# Patient Record
Sex: MALE | Race: WHITE | NOT HISPANIC OR LATINO | Employment: FULL TIME | URBAN - METROPOLITAN AREA
[De-identification: names, ages, dates, MRNs, and addresses within clinical notes are randomized per-mention and may not be internally consistent; named-entity substitution may affect disease eponyms.]

---

## 2023-06-12 ENCOUNTER — OFFICE VISIT (OUTPATIENT)
Dept: URGENT CARE | Facility: CLINIC | Age: 68
End: 2023-06-12
Payer: COMMERCIAL

## 2023-06-12 VITALS
OXYGEN SATURATION: 97 % | HEART RATE: 60 BPM | SYSTOLIC BLOOD PRESSURE: 122 MMHG | BODY MASS INDEX: 28.93 KG/M2 | HEIGHT: 66 IN | TEMPERATURE: 98 F | WEIGHT: 180 LBS | DIASTOLIC BLOOD PRESSURE: 80 MMHG

## 2023-06-12 DIAGNOSIS — R42 DIZZINESS: Primary | ICD-10-CM

## 2023-06-12 PROCEDURE — 99202 PR OFFICE/OUTPT VISIT, NEW, LEVL II, 15-29 MIN: ICD-10-PCS | Mod: ,,, | Performed by: NURSE PRACTITIONER

## 2023-06-12 PROCEDURE — 99202 OFFICE O/P NEW SF 15 MIN: CPT | Mod: ,,, | Performed by: NURSE PRACTITIONER

## 2023-06-12 NOTE — PROGRESS NOTES
"Subjective:       Patient ID: Ishmael Fuchs is a 68 y.o. male.    Vitals:  height is 5' 6" (1.676 m) and weight is 81.6 kg (180 lb). His oral temperature is 97.8 °F (36.6 °C). His blood pressure is 122/80 and his pulse is 60. His oxygen saturation is 97%.     Chief Complaint: Dizziness (Dizziness since last Monday.)    This is a 68 y.o. male who presents today with a chief complaint of dizziness for a week.  Want an appointment for physical therapy to  have Semont Maneuver at physical therapy. Patient explains that he has been seen for these symptoms by ENT who recommended that he undergo PT for same. Patient explains that he is here to get a referral to PT.        Dizziness:   Chronicity:  New  Onset:  1 to 4 weeks ago  Progression since onset:  Unchanged  Pain Scale:  0/10  Severity:  Severe  Duration:  Off/on all day  Dizziness characteristics:  Off-balance, walking on uneven surface, trouble focusing eyes, spinning inside head only and height vertigo (vertigo)   Associated symptoms: headaches, tinnitus, nausea, vomiting, visual disturbances and light-headedness.  Aggravated by:  Position changes and getting up  Risk factors:  Family history of inner ear and occupational noise exposure  Treatments tried: semont maneuver.   PMH includes: ear infections.    HENT:  Positive for tinnitus.    Gastrointestinal:  Positive for nausea and vomiting.   Neurological:  Positive for dizziness, light-headedness and headaches.         Objective:      Physical Exam   Constitutional: He is oriented to person, place, and time. normal  HENT:   Head: Normocephalic.   Nose: Nose normal.   Mouth/Throat: Mucous membranes are moist. Oropharynx is clear.   Eyes: Conjunctivae are normal. Extraocular movement intact   Neck: Neck supple.   Cardiovascular: Normal rate, regular rhythm, normal heart sounds and normal pulses.   Pulmonary/Chest: Effort normal and breath sounds normal.   Abdominal: Normal appearance.   Musculoskeletal: Normal " range of motion.         General: Normal range of motion.   Neurological: He is alert and oriented to person, place, and time.   Skin: Skin is warm and dry.   Psychiatric: His behavior is normal. Mood normal.   Vitals reviewed.      Past medical history and current medications reviewed.       Assessment:           1. Dizziness              Plan:         Dizziness             There are no Patient Instructions on file for this visit.           Medical Decision Making:   Differential Diagnosis:   Vertigo